# Patient Record
(demographics unavailable — no encounter records)

---

## 2025-05-05 NOTE — ASSESSMENT
[Vaccines Reviewed] : Immunizations reviewed today. Please see immunization details in the vaccine log within the immunization flowsheet.  [FreeTextEntry1] : 24 year old female here for annual exam. Routine labs drawn in office. Pap up to date. Gyn referral provided. STI testing.  Reviewed healthy lifestyle habits.

## 2025-05-05 NOTE — HEALTH RISK ASSESSMENT
[Good] : ~his/her~  mood as  good [Yes] : Yes [Monthly or less (1 pt)] : Monthly or less (1 point) [1 or 2 (0 pts)] : 1 or 2 (0 points) [Never (0 pts)] : Never (0 points) [No] : In the past 12 months have you used drugs other than those required for medical reasons? No [No falls in past year] : Patient reported no falls in the past year [0] : 2) Feeling down, depressed, or hopeless: Not at all (0) [PHQ-2 Negative - No further assessment needed] : PHQ-2 Negative - No further assessment needed [Patient reported PAP Smear was normal] : Patient reported PAP Smear was normal [Fully functional (bathing, dressing, toileting, transferring, walking, feeding)] : Fully functional (bathing, dressing, toileting, transferring, walking, feeding) [Fully functional (using the telephone, shopping, preparing meals, housekeeping, doing laundry, using] : Fully functional and needs no help or supervision to perform IADLs (using the telephone, shopping, preparing meals, housekeeping, doing laundry, using transportation, managing medications and managing finances) [Reports changes in vision] : Reports changes in vision [Never] : Never [Employed] : employed [Student] : student [FreeTextEntry1] : None [Audit-CScore] : 1 [de-identified] : Cardio, goes to gym 3-4 times a week [de-identified] : None [[id number]] : 0 [Change in mental status noted] : No change in mental status noted [Reports changes in hearing] : Reports no changes in hearing [PapSmearDate] : 2024 [FreeTextEntry2] : works in [employer] and taking [employer] [de-identified] : Blurry vision

## 2025-05-05 NOTE — HISTORY OF PRESENT ILLNESS
[de-identified] : 24 year old female here for annual exam. No acute concerns. Prior PCP [facility].   Lives with family.  Learning English and working in supermarket.  No sexual partners, had partner in [place].  LMP irregular, thinks may have PCOS.